# Patient Record
Sex: FEMALE | Race: WHITE | NOT HISPANIC OR LATINO | URBAN - METROPOLITAN AREA
[De-identification: names, ages, dates, MRNs, and addresses within clinical notes are randomized per-mention and may not be internally consistent; named-entity substitution may affect disease eponyms.]

---
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
## 2025-04-04 ENCOUNTER — APPOINTMENT (OUTPATIENT)
Dept: OBSTETRICS AND GYNECOLOGY | Facility: CLINIC | Age: 20
End: 2025-04-04
Payer: COMMERCIAL

## 2025-05-02 ENCOUNTER — HOSPITAL ENCOUNTER (EMERGENCY)
Facility: HOSPITAL | Age: 20
Discharge: HOME | End: 2025-05-02
Attending: EMERGENCY MEDICINE
Payer: COMMERCIAL

## 2025-05-02 ENCOUNTER — CLINICAL SUPPORT (OUTPATIENT)
Dept: EMERGENCY MEDICINE | Facility: HOSPITAL | Age: 20
End: 2025-05-02
Payer: COMMERCIAL

## 2025-05-02 ENCOUNTER — PHARMACY VISIT (OUTPATIENT)
Dept: PHARMACY | Facility: CLINIC | Age: 20
End: 2025-05-02
Payer: COMMERCIAL

## 2025-05-02 VITALS
BODY MASS INDEX: 23.9 KG/M2 | TEMPERATURE: 98 F | OXYGEN SATURATION: 100 % | RESPIRATION RATE: 16 BRPM | HEART RATE: 85 BPM | SYSTOLIC BLOOD PRESSURE: 118 MMHG | WEIGHT: 140 LBS | DIASTOLIC BLOOD PRESSURE: 75 MMHG | HEIGHT: 64 IN

## 2025-05-02 DIAGNOSIS — R20.2 ARM PARESTHESIA, LEFT: ICD-10-CM

## 2025-05-02 DIAGNOSIS — R51.9 ACUTE NONINTRACTABLE HEADACHE, UNSPECIFIED HEADACHE TYPE: Primary | ICD-10-CM

## 2025-05-02 LAB
ANION GAP SERPL CALC-SCNC: 12 MMOL/L (ref 10–20)
BASOPHILS # BLD AUTO: 0.06 X10*3/UL (ref 0–0.1)
BASOPHILS NFR BLD AUTO: 0.7 %
BUN SERPL-MCNC: 11 MG/DL (ref 6–23)
CALCIUM SERPL-MCNC: 9.3 MG/DL (ref 8.6–10.6)
CHLORIDE SERPL-SCNC: 106 MMOL/L (ref 98–107)
CO2 SERPL-SCNC: 24 MMOL/L (ref 21–32)
CREAT SERPL-MCNC: 0.73 MG/DL (ref 0.5–1.05)
EGFRCR SERPLBLD CKD-EPI 2021: >90 ML/MIN/1.73M*2
EOSINOPHIL # BLD AUTO: 0.11 X10*3/UL (ref 0–0.7)
EOSINOPHIL NFR BLD AUTO: 1.4 %
ERYTHROCYTE [DISTWIDTH] IN BLOOD BY AUTOMATED COUNT: 12.9 % (ref 11.5–14.5)
GLUCOSE SERPL-MCNC: 94 MG/DL (ref 74–99)
HCT VFR BLD AUTO: 36.6 % (ref 36–46)
HGB BLD-MCNC: 12.7 G/DL (ref 12–16)
IMM GRANULOCYTES # BLD AUTO: 0.03 X10*3/UL (ref 0–0.7)
IMM GRANULOCYTES NFR BLD AUTO: 0.4 % (ref 0–0.9)
LYMPHOCYTES # BLD AUTO: 1.91 X10*3/UL (ref 1.2–4.8)
LYMPHOCYTES NFR BLD AUTO: 23.6 %
MCH RBC QN AUTO: 30.3 PG (ref 26–34)
MCHC RBC AUTO-ENTMCNC: 34.7 G/DL (ref 32–36)
MCV RBC AUTO: 87 FL (ref 80–100)
MONOCYTES # BLD AUTO: 0.55 X10*3/UL (ref 0.1–1)
MONOCYTES NFR BLD AUTO: 6.8 %
NEUTROPHILS # BLD AUTO: 5.44 X10*3/UL (ref 1.2–7.7)
NEUTROPHILS NFR BLD AUTO: 67.1 %
NRBC BLD-RTO: 0 /100 WBCS (ref 0–0)
PLATELET # BLD AUTO: 374 X10*3/UL (ref 150–450)
POTASSIUM SERPL-SCNC: 4 MMOL/L (ref 3.5–5.3)
PREGNANCY TEST URINE, POC: NEGATIVE
RBC # BLD AUTO: 4.19 X10*6/UL (ref 4–5.2)
SODIUM SERPL-SCNC: 138 MMOL/L (ref 136–145)
WBC # BLD AUTO: 8.1 X10*3/UL (ref 4.4–11.3)

## 2025-05-02 PROCEDURE — 81025 URINE PREGNANCY TEST: CPT

## 2025-05-02 PROCEDURE — 2500000001 HC RX 250 WO HCPCS SELF ADMINISTERED DRUGS (ALT 637 FOR MEDICARE OP)

## 2025-05-02 PROCEDURE — 99284 EMERGENCY DEPT VISIT MOD MDM: CPT | Performed by: EMERGENCY MEDICINE

## 2025-05-02 PROCEDURE — 96375 TX/PRO/DX INJ NEW DRUG ADDON: CPT

## 2025-05-02 PROCEDURE — 96374 THER/PROPH/DIAG INJ IV PUSH: CPT

## 2025-05-02 PROCEDURE — RXMED WILLOW AMBULATORY MEDICATION CHARGE

## 2025-05-02 PROCEDURE — 93005 ELECTROCARDIOGRAM TRACING: CPT

## 2025-05-02 PROCEDURE — 85025 COMPLETE CBC W/AUTO DIFF WBC: CPT

## 2025-05-02 PROCEDURE — 96361 HYDRATE IV INFUSION ADD-ON: CPT

## 2025-05-02 PROCEDURE — 2500000004 HC RX 250 GENERAL PHARMACY W/ HCPCS (ALT 636 FOR OP/ED): Mod: JZ

## 2025-05-02 PROCEDURE — 80048 BASIC METABOLIC PNL TOTAL CA: CPT

## 2025-05-02 PROCEDURE — 36415 COLL VENOUS BLD VENIPUNCTURE: CPT

## 2025-05-02 RX ORDER — METOCLOPRAMIDE 10 MG/1
10 TABLET ORAL EVERY 6 HOURS
Qty: 28 TABLET | Refills: 0 | Status: SHIPPED | OUTPATIENT
Start: 2025-05-02 | End: 2025-05-02

## 2025-05-02 RX ORDER — METOCLOPRAMIDE HYDROCHLORIDE 5 MG/ML
10 INJECTION INTRAMUSCULAR; INTRAVENOUS ONCE
Status: COMPLETED | OUTPATIENT
Start: 2025-05-02 | End: 2025-05-02

## 2025-05-02 RX ORDER — ACETAMINOPHEN 325 MG/1
650 TABLET ORAL ONCE
Status: COMPLETED | OUTPATIENT
Start: 2025-05-02 | End: 2025-05-02

## 2025-05-02 RX ORDER — METOCLOPRAMIDE 10 MG/1
10 TABLET ORAL EVERY 6 HOURS
Qty: 28 TABLET | Refills: 0 | Status: SHIPPED | OUTPATIENT
Start: 2025-05-02 | End: 2025-05-09

## 2025-05-02 RX ORDER — KETOROLAC TROMETHAMINE 30 MG/ML
30 INJECTION, SOLUTION INTRAMUSCULAR; INTRAVENOUS ONCE
Status: COMPLETED | OUTPATIENT
Start: 2025-05-02 | End: 2025-05-02

## 2025-05-02 RX ADMIN — ACETAMINOPHEN 650 MG: 325 TABLET ORAL at 15:29

## 2025-05-02 RX ADMIN — SODIUM CHLORIDE, SODIUM LACTATE, POTASSIUM CHLORIDE, AND CALCIUM CHLORIDE 1000 ML: .6; .31; .03; .02 INJECTION, SOLUTION INTRAVENOUS at 15:36

## 2025-05-02 RX ADMIN — METOCLOPRAMIDE 10 MG: 5 INJECTION, SOLUTION INTRAMUSCULAR; INTRAVENOUS at 15:29

## 2025-05-02 RX ADMIN — KETOROLAC TROMETHAMINE 30 MG: 30 INJECTION, SOLUTION INTRAMUSCULAR; INTRAVENOUS at 15:30

## 2025-05-02 ASSESSMENT — LIFESTYLE VARIABLES
HAVE PEOPLE ANNOYED YOU BY CRITICIZING YOUR DRINKING: NO
TOTAL SCORE: 0
EVER FELT BAD OR GUILTY ABOUT YOUR DRINKING: NO
HAVE YOU EVER FELT YOU SHOULD CUT DOWN ON YOUR DRINKING: NO
EVER HAD A DRINK FIRST THING IN THE MORNING TO STEADY YOUR NERVES TO GET RID OF A HANGOVER: NO

## 2025-05-02 ASSESSMENT — PAIN DESCRIPTION - PAIN TYPE: TYPE: ACUTE PAIN

## 2025-05-02 ASSESSMENT — COLUMBIA-SUICIDE SEVERITY RATING SCALE - C-SSRS
6. HAVE YOU EVER DONE ANYTHING, STARTED TO DO ANYTHING, OR PREPARED TO DO ANYTHING TO END YOUR LIFE?: NO
1. IN THE PAST MONTH, HAVE YOU WISHED YOU WERE DEAD OR WISHED YOU COULD GO TO SLEEP AND NOT WAKE UP?: NO
2. HAVE YOU ACTUALLY HAD ANY THOUGHTS OF KILLING YOURSELF?: NO

## 2025-05-02 ASSESSMENT — PAIN SCALES - GENERAL
PAINLEVEL_OUTOF10: 3
PAINLEVEL_OUTOF10: 4

## 2025-05-02 ASSESSMENT — PAIN - FUNCTIONAL ASSESSMENT
PAIN_FUNCTIONAL_ASSESSMENT: 0-10
PAIN_FUNCTIONAL_ASSESSMENT: 0-10

## 2025-05-02 ASSESSMENT — PAIN DESCRIPTION - LOCATION: LOCATION: HEAD

## 2025-05-02 NOTE — ED TRIAGE NOTES
Pt presented to ED for c/o syncope, HA, L arm numbness, slurred speech. None of these symptoms currently but only endorses a HA and numbness in her L fingertips.

## 2025-05-02 NOTE — Clinical Note
Jenny aY was seen and treated in our emergency department on 5/2/2025.  She may return to school on 05/05/2025.      If you have any questions or concerns, please don't hesitate to call.      Kal Little MD

## 2025-05-02 NOTE — ED PROVIDER NOTES
History of Present Illness   Information Gathering: History collected from patient and chart review    HPI:  Jenny Ya is a 19 y.o. female with no reported past medical history presenting to the emergency department for headache and fingertip numbness.  Patient states that she was her in her usual state of health until this past Wednesday.  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  On Monday, she noticed slight progression of numbness and paresthesias along her left fingertips hand and lower arm that did not follow a particular nerve or dermatomal distribution.  She states that the next day (Thursday), she developed a bifrontal headache that was slow in onset that became severe.  She did not take any medications at home to leave this.  She states that last night when she was talking to some friends, she had approximately 1 minute episode where she felt dysarthric however her speech became normal shortly afterwards.  She states that today headache is better than it was yesterday however her fingertips (digits 1 through 5) on the left are still numb.  She denies any fevers at home.  Denies thunderclap type headache.  Denies altered mental status.  Has not started any new medications over the past week and otherwise has not noticed any other changes.    Physical Exam   Triage vitals:  T 36.7 °C (98 °F)  HR 85  /75  RR 16  O2 100 %      Physical Exam  Constitutional:       Appearance: Normal appearance.   HENT:      Head: Normocephalic and atraumatic.   Eyes:      Extraocular Movements: Extraocular movements intact.      Pupils: Pupils are equal, round, and reactive to light.   Cardiovascular:      Rate and Rhythm: Normal rate and regular rhythm.   Pulmonary:      Effort: Pulmonary effort is normal.      Breath sounds: Normal breath sounds.   Abdominal:      General: Abdomen is flat.      Palpations: Abdomen is soft.   Musculoskeletal:          General: No swelling or signs of injury. Normal range of motion.   Neurological:      Mental Status: She is alert.      Comments:  Patient is awake and alert. Speech is clear. Face is symmetric without facial droop and facial sensation to light touch equal bilaterally. Uvula midline. Tongue protrusion midline. Hearing intact bilaterally. Full and equal shoulder shrug and head turn against resistance. 5/5 motor strength of UEs and LEs. Sensation to light touch intact in all four extremities. Finger to nose and heel to shin intact. No pronator drift. No gait abnormalities. Rombergs negative.  Patient has full sensation over left fingertips with eyes closed to light touch and pinprick         Medical Decision Making & ED Course   Medical Decision Makin y.o. female with no reported past medical history presenting to the emergency department for headache and fingertip numbness.  On presentation, patient is hemodynamically stable, afebrile and saturating well on room air without signs of increased respiratory effort.  Currently, headache is described as tension type headache primarily affecting bilateral frontal area and on neurologic exam there are no focal findings.  She ambulates easily without assistance.  Patient is afebrile and overall low concern for meningitis.  Headaches are not worse in the morning or positionally dependent and overall low concern for IIH.  She has a history of migraines which she states feels similar to this before without the fingertip numbness.  Fingertip distribution over the left eye fingertips does not follow any particular dermatomal or nerve distribution.  Fingertips are with quick cap refill.  On laboratory evaluation she is without evidence of leukocytosis anemia or electrolyte derangements.  Patient not pregnant.  For headache, she was treated with 1 L of LR, Toradol acetaminophen and Reglan.  On reevaluation approximately 1 hour later, patient reports complete resolution of  headache.  Very low concern for stroke or ICH given her response to treatment and no known risk factors.  Given her history combined with headache qualities today headache most likely represents complex migraine.  As a result, she was advised to keep a headache journal and follow-up with neurology clinic.  Patient discharged in stable condition with return precautions.    ED Course:  ED Course as of 05/02/25 1708   Fri May 02, 2025   1656 ED Attending Documentation: This patient was seen by the resident physician.  I have seen and examined the patient, agree with the workup, evaluation, management and diagnosis. I reviewed and edited the above documentation where necessary.On my evaluation of the patient: 19-year-old female who presents to the Emergency Department with numbness and tingling of her left upper extremity and face along with a severe headache that started yesterday.  Was not sudden onset, she does have a history of migraines but has never had a complex migraine before.  Neurologic exam is normal and she says the numbness and tingling in her hands is gone.  Good response to treatment here in the emergency room with a headache cocktail.  High concern for a primary headache, discussed imaging with her and her family and they are okay following up with neurology given I have low concern for any acute intracranial abnormalities.    Kal Little MD   Attending Physician   [RG]      ED Course User Index  [RG] Kal Little MD         Diagnoses as of 05/02/25 1708   Acute nonintractable headache, unspecified headache type   Arm paresthesia, left       ----    Independent Result Review and Interpretation: Relevant laboratory and radiographic results were reviewed and independently interpreted by myself.  As necessary, they are commented on in the ED Course.    Chronic conditions affecting the patient's care: As documented above in MDM    The patient was discussed with the following consultants/services: As  described in MDM      Disposition   As a result of the work-up, the patient was discharged home.  she was informed of her diagnosis and instructed to come back with any concerns or worsening of condition.  she and was agreeable to the plan as discussed above.  she was given the opportunity to ask questions.  All of the patient's questions were answered.    Procedures   Procedures    Patient seen and discussed with ED attending physician.    Manav Patterson MD  Emergency Medicine, PGY-2      Mikie Patterson MD  Resident  05/02/25 [id number]

## 2025-05-03 LAB
ATRIAL RATE: 85 BPM
P AXIS: 77 DEGREES
P OFFSET: 199 MS
P ONSET: 151 MS
PR INTERVAL: 142 MS
Q ONSET: 222 MS
QRS COUNT: 14 BEATS
QRS DURATION: 80 MS
QT INTERVAL: 364 MS
QTC CALCULATION(BAZETT): 433 MS
QTC FREDERICIA: 408 MS
R AXIS: 56 DEGREES
T AXIS: 50 DEGREES
T OFFSET: 404 MS
VENTRICULAR RATE: 85 BPM

## 2025-06-06 ENCOUNTER — APPOINTMENT (OUTPATIENT)
Dept: DERMATOLOGY | Facility: CLINIC | Age: 20
End: 2025-06-06
Payer: COMMERCIAL

## 2025-07-03 ENCOUNTER — APPOINTMENT (OUTPATIENT)
Dept: DERMATOLOGY | Facility: CLINIC | Age: 20
End: 2025-07-03
Payer: COMMERCIAL

## 2025-07-16 ENCOUNTER — HOSPITAL ENCOUNTER (OUTPATIENT)
Dept: RADIOLOGY | Facility: CLINIC | Age: 20
Discharge: HOME | End: 2025-07-16
Payer: COMMERCIAL

## 2025-07-16 ENCOUNTER — OFFICE VISIT (OUTPATIENT)
Dept: ORTHOPEDIC SURGERY | Facility: CLINIC | Age: 20
End: 2025-07-16
Payer: COMMERCIAL

## 2025-07-16 VITALS — BODY MASS INDEX: 29.01 KG/M2 | WEIGHT: 169 LBS

## 2025-07-16 DIAGNOSIS — M25.511 ACUTE PAIN OF RIGHT SHOULDER: Primary | ICD-10-CM

## 2025-07-16 DIAGNOSIS — M25.511 ACUTE PAIN OF RIGHT SHOULDER: ICD-10-CM

## 2025-07-16 PROCEDURE — 99202 OFFICE O/P NEW SF 15 MIN: CPT | Performed by: ORTHOPAEDIC SURGERY

## 2025-07-16 PROCEDURE — 73030 X-RAY EXAM OF SHOULDER: CPT | Mod: RIGHT SIDE | Performed by: RADIOLOGY

## 2025-07-16 PROCEDURE — 99203 OFFICE O/P NEW LOW 30 MIN: CPT | Performed by: ORTHOPAEDIC SURGERY

## 2025-07-16 PROCEDURE — 73030 X-RAY EXAM OF SHOULDER: CPT | Mod: RT

## 2025-07-16 NOTE — PROGRESS NOTES
"Chief Complaint: Right shoulder pain    History: 19 y.o. female RHD who presents with 6 months of right shoulder pain that has worsened over time. 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 IT is an achy pain and she can not throw without pain. She has taken months off to rest it but no improvement. No acute traumatic event. She describes pain that travels down her right arm and sometimes she has numbness down that arm, but it does not go into her fingers. She trialed a period of rest after this started in January, and just recently returned to playing but the pain returned/worsened. Endorses clicking in the shoulder for years. She previously had cubital tunnel syndrome, but this numbness feels different than the cubital tunnel and that has resolved. She also reports history of biceps tendonitis (distal), but reports this pain is different and more extreme than the pain in 2021. She also reports having a previous MRI years ago at an outside facility and was told she had \"a deformed shoulder\". Mri had shown by report post capsular irregularity. Denies any dislocations. 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     Physical Exam:   Right upper extremity:  - Motor and sensation intact M/U/R distributions  - Palpable radial pulse  - Cap refill < 2 seconds in all digits    PROM:   FE (170)   ER 60 ABER/ABIR: 90/90    AROM:   FE (170)   ER 45 IR T8  Strength:  Supra [5/5] Infra [5/5] Subscap [5/5]  Abd [5/5]    Special Tests  Shoulder  + Cross arm  + Speed  + Yergason      Imaging that was personally reviewed: X-ray imaging of Right shoulder 4-views reviewed showing no bony abnormalities.  MR w IV contrast report (2021) reviewed showing mild irregularity of posterior capsule - unable to review images.    Assessment/Plan: 19 y.o. female with right shoulder pain that may be from a long head biceps injury " or slap lesion.    After a thorough discussion with the patient we discussed conservative options including shoulder steroid injection and repeating the MRI for any soft tissue abnormalities given the report of posterior capsule abnormalities and worsening of shoulder pain and patient reporting it feels different than the pain in 2021. We also discussed continuing ice and NSAIDS/tylenol. Patient was provided with referral for steroid injection with one of our shoulder specialists. I also suggest they repeat the MRI with IV contrast and then steroid injection by one of our shoulder experts.       ** This office note was dictated using Dragon voice to text software and was not proofread for spelling or grammatical errors **

## 2025-07-18 ENCOUNTER — OFFICE VISIT (OUTPATIENT)
Dept: ORTHOPEDIC SURGERY | Facility: HOSPITAL | Age: 20
End: 2025-07-18
Payer: COMMERCIAL

## 2025-07-18 VITALS — WEIGHT: 140 LBS | BODY MASS INDEX: 23.9 KG/M2 | HEIGHT: 64 IN

## 2025-07-18 DIAGNOSIS — M25.511 RIGHT SHOULDER PAIN, UNSPECIFIED CHRONICITY: ICD-10-CM

## 2025-07-18 PROCEDURE — 99214 OFFICE O/P EST MOD 30 MIN: CPT | Performed by: ORTHOPAEDIC SURGERY

## 2025-07-18 PROCEDURE — 3008F BODY MASS INDEX DOCD: CPT | Performed by: ORTHOPAEDIC SURGERY

## 2025-07-18 RX ORDER — MINOCYCLINE HYDROCHLORIDE 100 MG/1
1 CAPSULE ORAL
COMMUNITY
Start: 2025-06-12

## 2025-07-18 RX ORDER — SUMATRIPTAN SUCCINATE 50 MG/1
TABLET ORAL
COMMUNITY
Start: 2025-07-10

## 2025-07-18 ASSESSMENT — PAIN SCALES - GENERAL: PAINLEVEL_OUTOF10: 8

## 2025-07-18 ASSESSMENT — PAIN - FUNCTIONAL ASSESSMENT: PAIN_FUNCTIONAL_ASSESSMENT: 0-10

## 2025-07-18 NOTE — PROGRESS NOTES
Patient is a cleated  at [employer].  She has been having pain in her shoulder since a rather sudden onset of anterior pain after throwing currently with throwing after release she has pain in the shoulder diffusely around the deltoid primarily no paresthesias.  No neck pain.  She has had an MRI scan of the shoulder and told that she had some sort of abnormality or deformity of the shoulder on an MRI scan a few years ago.  She has not had recent imaging.    The patient is pleasant and cooperative.  The patient is alert and oriented ×3.  Auditory function is intact.  The patient is a good historian.  The patient is not in acute distress.  Eye exam significant for nonicteric sclera, intact ocular muscle movement.  Breathing is rhythmic symmetric and nonlabored.  Patient has full range of motion internal rotation and abduction 70 degrees external rotation and abduction of 90 degrees elevation 180 degrees.  Pain at the extremes of range of motion particularly internal and external rotation with abduction Neer's test is positive Belmar's test is negative.  No apprehension.  Motor power abduction internal and external rotation 5/5.    Radiographs demonstrate preservation subacromial glenohumeral space no obvious arthritic degenerative changes or fracture dislocation or subluxation.    Right shoulder pain we discussed differential diagnosis which primarily includes at the top of the differential diagnosis labrum pathology and subacromial pathology.  Exam favor subacromial pathology recommended subacromial injection next night was done today.  I have asked patient to call back next week.  If she does not get better I would recommend further evaluation by MRI arthrogram of the right shoulder.        This was dictated using voice recognition software and not corrected for grammatical or spelling errors.

## 2025-07-28 ENCOUNTER — TELEPHONE (OUTPATIENT)
Dept: ORTHOPEDIC SURGERY | Facility: HOSPITAL | Age: 20
End: 2025-07-28
Payer: COMMERCIAL

## 2025-07-28 DIAGNOSIS — S43.432D LABRAL TEAR OF SHOULDER, LEFT, SUBSEQUENT ENCOUNTER: ICD-10-CM

## 2025-07-28 NOTE — TELEPHONE ENCOUNTER
Right Shoulder improved only mildly with injection still pain while throwing.  She would like to proceed with MRI arthrogram at this time.  Order entered and scheduling information. provided.

## 2025-08-04 ENCOUNTER — APPOINTMENT (OUTPATIENT)
Dept: ORTHOPEDIC SURGERY | Facility: HOSPITAL | Age: 20
End: 2025-08-04
Payer: COMMERCIAL

## 2025-08-08 ENCOUNTER — APPOINTMENT (OUTPATIENT)
Dept: RADIOLOGY | Facility: HOSPITAL | Age: 20
End: 2025-08-08
Payer: COMMERCIAL

## 2025-08-11 ENCOUNTER — APPOINTMENT (OUTPATIENT)
Dept: ORTHOPEDIC SURGERY | Facility: HOSPITAL | Age: 20
End: 2025-08-11
Payer: COMMERCIAL

## 2025-08-20 ENCOUNTER — APPOINTMENT (OUTPATIENT)
Dept: RADIOLOGY | Facility: HOSPITAL | Age: 20
End: 2025-08-20
Payer: COMMERCIAL